# Patient Record
Sex: MALE | Race: WHITE | NOT HISPANIC OR LATINO
[De-identification: names, ages, dates, MRNs, and addresses within clinical notes are randomized per-mention and may not be internally consistent; named-entity substitution may affect disease eponyms.]

---

## 2017-06-07 PROBLEM — Z00.00 ENCOUNTER FOR PREVENTIVE HEALTH EXAMINATION: Status: ACTIVE | Noted: 2017-06-07

## 2017-06-12 ENCOUNTER — APPOINTMENT (OUTPATIENT)
Dept: ORTHOPEDIC SURGERY | Facility: CLINIC | Age: 62
End: 2017-06-12

## 2017-06-12 VITALS — BODY MASS INDEX: 29.8 KG/M2 | WEIGHT: 220 LBS | HEIGHT: 72 IN

## 2017-06-12 DIAGNOSIS — Z60.2 PROBLEMS RELATED TO LIVING ALONE: ICD-10-CM

## 2017-06-12 DIAGNOSIS — E78.00 PURE HYPERCHOLESTEROLEMIA, UNSPECIFIED: ICD-10-CM

## 2017-06-12 DIAGNOSIS — Z78.9 OTHER SPECIFIED HEALTH STATUS: ICD-10-CM

## 2017-06-12 DIAGNOSIS — Z82.61 FAMILY HISTORY OF ARTHRITIS: ICD-10-CM

## 2017-06-12 DIAGNOSIS — M51.36 OTHER INTERVERTEBRAL DISC DEGENERATION, LUMBAR REGION: ICD-10-CM

## 2017-06-12 DIAGNOSIS — Z86.73 PERSONAL HISTORY OF TRANSIENT ISCHEMIC ATTACK (TIA), AND CEREBRAL INFARCTION W/OUT RESIDUAL DEFICITS: ICD-10-CM

## 2017-06-12 RX ORDER — SIMVASTATIN 80 MG/1
TABLET, FILM COATED ORAL
Refills: 0 | Status: ACTIVE | COMMUNITY

## 2017-06-12 RX ORDER — BENAZEPRIL HYDROCHLORIDE AND HYDROCHLOROTHIAZIDE 20; 25 MG/1; MG/1
TABLET, FILM COATED ORAL
Refills: 0 | Status: ACTIVE | COMMUNITY

## 2017-06-12 RX ORDER — DICLOFENAC SODIUM 75 MG/1
75 TABLET, DELAYED RELEASE ORAL
Qty: 90 | Refills: 1 | Status: ACTIVE | COMMUNITY
Start: 2017-06-12 | End: 1900-01-01

## 2017-06-12 SDOH — SOCIAL STABILITY - SOCIAL INSECURITY: PROBLEMS RELATED TO LIVING ALONE: Z60.2

## 2018-07-28 PROBLEM — Z86.73 HISTORY OF STROKE: Status: RESOLVED | Noted: 2017-06-12 | Resolved: 2018-07-28

## 2019-10-02 PROBLEM — Z60.2 PERSON LIVING ALONE: Status: ACTIVE | Noted: 2017-06-12

## 2021-01-18 ENCOUNTER — APPOINTMENT (OUTPATIENT)
Dept: SURGERY | Facility: CLINIC | Age: 66
End: 2021-01-18
Payer: MEDICARE

## 2021-01-18 PROCEDURE — 99204 OFFICE O/P NEW MOD 45 MIN: CPT

## 2021-01-27 ENCOUNTER — OUTPATIENT (OUTPATIENT)
Dept: OUTPATIENT SERVICES | Facility: HOSPITAL | Age: 66
LOS: 1 days | End: 2021-01-27
Payer: MEDICARE

## 2021-01-27 VITALS
WEIGHT: 210.1 LBS | RESPIRATION RATE: 16 BRPM | OXYGEN SATURATION: 97 % | DIASTOLIC BLOOD PRESSURE: 62 MMHG | HEIGHT: 72 IN | SYSTOLIC BLOOD PRESSURE: 133 MMHG | HEART RATE: 56 BPM

## 2021-01-27 DIAGNOSIS — Z01.818 ENCOUNTER FOR OTHER PREPROCEDURAL EXAMINATION: ICD-10-CM

## 2021-01-27 DIAGNOSIS — K40.30 UNILATERAL INGUINAL HERNIA, WITH OBSTRUCTION, WITHOUT GANGRENE, NOT SPECIFIED AS RECURRENT: ICD-10-CM

## 2021-01-27 DIAGNOSIS — K40.90 UNILATERAL INGUINAL HERNIA, WITHOUT OBSTRUCTION OR GANGRENE, NOT SPECIFIED AS RECURRENT: ICD-10-CM

## 2021-01-27 LAB
ANION GAP SERPL CALC-SCNC: 6 MMOL/L — SIGNIFICANT CHANGE UP (ref 5–17)
BUN SERPL-MCNC: 17 MG/DL — SIGNIFICANT CHANGE UP (ref 7–23)
CALCIUM SERPL-MCNC: 9.2 MG/DL — SIGNIFICANT CHANGE UP (ref 8.5–10.1)
CHLORIDE SERPL-SCNC: 105 MMOL/L — SIGNIFICANT CHANGE UP (ref 96–108)
CO2 SERPL-SCNC: 30 MMOL/L — SIGNIFICANT CHANGE UP (ref 22–31)
CREAT SERPL-MCNC: 0.96 MG/DL — SIGNIFICANT CHANGE UP (ref 0.5–1.3)
GLUCOSE SERPL-MCNC: 92 MG/DL — SIGNIFICANT CHANGE UP (ref 70–99)
HCT VFR BLD CALC: 50.9 % — HIGH (ref 39–50)
HGB BLD-MCNC: 17.2 G/DL — HIGH (ref 13–17)
MCHC RBC-ENTMCNC: 30.8 PG — SIGNIFICANT CHANGE UP (ref 27–34)
MCHC RBC-ENTMCNC: 33.8 GM/DL — SIGNIFICANT CHANGE UP (ref 32–36)
MCV RBC AUTO: 91.2 FL — SIGNIFICANT CHANGE UP (ref 80–100)
NRBC # BLD: 0 /100 WBCS — SIGNIFICANT CHANGE UP (ref 0–0)
PLATELET # BLD AUTO: 159 K/UL — SIGNIFICANT CHANGE UP (ref 150–400)
POTASSIUM SERPL-MCNC: 4.4 MMOL/L — SIGNIFICANT CHANGE UP (ref 3.5–5.3)
POTASSIUM SERPL-SCNC: 4.4 MMOL/L — SIGNIFICANT CHANGE UP (ref 3.5–5.3)
RBC # BLD: 5.58 M/UL — SIGNIFICANT CHANGE UP (ref 4.2–5.8)
RBC # FLD: 11.8 % — SIGNIFICANT CHANGE UP (ref 10.3–14.5)
SODIUM SERPL-SCNC: 141 MMOL/L — SIGNIFICANT CHANGE UP (ref 135–145)
WBC # BLD: 6.14 K/UL — SIGNIFICANT CHANGE UP (ref 3.8–10.5)
WBC # FLD AUTO: 6.14 K/UL — SIGNIFICANT CHANGE UP (ref 3.8–10.5)

## 2021-01-27 PROCEDURE — 36415 COLL VENOUS BLD VENIPUNCTURE: CPT

## 2021-01-27 PROCEDURE — 93010 ELECTROCARDIOGRAM REPORT: CPT

## 2021-01-27 PROCEDURE — 85027 COMPLETE CBC AUTOMATED: CPT

## 2021-01-27 PROCEDURE — G0463: CPT

## 2021-01-27 PROCEDURE — 80048 BASIC METABOLIC PNL TOTAL CA: CPT

## 2021-01-27 PROCEDURE — 93005 ELECTROCARDIOGRAM TRACING: CPT

## 2021-01-27 NOTE — H&P PST ADULT - NSICDXPASTMEDICALHX_GEN_ALL_CORE_FT
PAST MEDICAL HISTORY:  HTN (hypertension)     Stroke 1995 no residual, had some slurred speech for few hrs-days

## 2021-01-27 NOTE — H&P PST ADULT - HISTORY OF PRESENT ILLNESS
67 y/o male, PMH of HTN , with c/o of bulging in the right groin for about 4 yrs. Some discomfort  , denies any pain. Was referred by urologist, seen by Dr Bueno about a week ago. Scheduled for right inguinal hernia repair with mesh. pre op testing today.

## 2021-01-27 NOTE — H&P PST ADULT - NS ABD PE RECTAL EXAM
normal appearance , without tenderness upon palpation , no deformities , trachea midline , Thyroid normal size , no thyroid nodules , no masses , no JVD , thyroid nontender not examined

## 2021-01-27 NOTE — H&P PST ADULT - NSICDXPROBLEM_GEN_ALL_CORE_FT
PROBLEM DIAGNOSES  Problem: Right inguinal hernia  Assessment and Plan: Repair of right inguinal hernia with mesh. pre op testing today.   Medical eval advised.  Pre op instructions:  Hold OTC supplements. Medications reviewed for the week and morning of surgery.  NPO after 11pm to the morning of surgery.  Shower 2 days before and the morning of surgery with antibacterial soap as instructed.  Covid testing information given.  Patient verbalized understanding.

## 2021-01-27 NOTE — H&P PST ADULT - NSANTHOSAYNRD_GEN_A_CORE
No. KELECHI screening performed.  STOP BANG Legend: 0-2 = LOW Risk; 3-4 = INTERMEDIATE Risk; 5-8 = HIGH Risk

## 2021-01-27 NOTE — H&P PST ADULT - NSICDXFAMILYHX_GEN_ALL_CORE_FT
FAMILY HISTORY:  FH: prostate cancer, Father:   FH: type 2 diabetes, Mother:   FH: type 2 diabetes, sister and brother: alive and well  FHx: heart disease, Mother:

## 2021-02-05 PROBLEM — I10 ESSENTIAL (PRIMARY) HYPERTENSION: Chronic | Status: ACTIVE | Noted: 2021-01-27

## 2021-02-05 PROBLEM — I63.9 CEREBRAL INFARCTION, UNSPECIFIED: Chronic | Status: ACTIVE | Noted: 2021-01-27

## 2021-02-08 ENCOUNTER — OUTPATIENT (OUTPATIENT)
Dept: OUTPATIENT SERVICES | Facility: HOSPITAL | Age: 66
LOS: 1 days | End: 2021-02-08
Payer: MEDICARE

## 2021-02-08 DIAGNOSIS — Z20.828 CONTACT WITH AND (SUSPECTED) EXPOSURE TO OTHER VIRAL COMMUNICABLE DISEASES: ICD-10-CM

## 2021-02-08 LAB — SARS-COV-2 RNA SPEC QL NAA+PROBE: SIGNIFICANT CHANGE UP

## 2021-02-08 PROCEDURE — U0003: CPT

## 2021-02-08 PROCEDURE — U0005: CPT

## 2021-02-09 ENCOUNTER — TRANSCRIPTION ENCOUNTER (OUTPATIENT)
Age: 66
End: 2021-02-09

## 2021-02-09 RX ORDER — SODIUM CHLORIDE 9 MG/ML
1000 INJECTION, SOLUTION INTRAVENOUS
Refills: 0 | Status: DISCONTINUED | OUTPATIENT
Start: 2021-02-09 | End: 2021-02-09

## 2021-02-09 NOTE — ASU PATIENT PROFILE, ADULT - FALLEN IN THE PAST
no Tazorac Counseling:  Patient advised that medication is irritating and drying.  Patient may need to apply sparingly and wash off after an hour before eventually leaving it on overnight.  The patient verbalized understanding of the proper use and possible adverse effects of tazorac.  All of the patient's questions and concerns were addressed.

## 2021-02-10 ENCOUNTER — APPOINTMENT (OUTPATIENT)
Dept: SURGERY | Facility: HOSPITAL | Age: 66
End: 2021-02-10
Payer: MEDICARE

## 2021-02-10 ENCOUNTER — OUTPATIENT (OUTPATIENT)
Dept: OUTPATIENT SERVICES | Facility: HOSPITAL | Age: 66
LOS: 1 days | End: 2021-02-10
Payer: MEDICARE

## 2021-02-10 VITALS
OXYGEN SATURATION: 94 % | DIASTOLIC BLOOD PRESSURE: 67 MMHG | SYSTOLIC BLOOD PRESSURE: 132 MMHG | TEMPERATURE: 98 F | HEART RATE: 50 BPM

## 2021-02-10 VITALS
HEART RATE: 74 BPM | TEMPERATURE: 98 F | DIASTOLIC BLOOD PRESSURE: 65 MMHG | OXYGEN SATURATION: 100 % | SYSTOLIC BLOOD PRESSURE: 136 MMHG | RESPIRATION RATE: 16 BRPM

## 2021-02-10 DIAGNOSIS — K40.30 UNILATERAL INGUINAL HERNIA, WITH OBSTRUCTION, WITHOUT GANGRENE, NOT SPECIFIED AS RECURRENT: ICD-10-CM

## 2021-02-10 PROCEDURE — 49507 PRP I/HERN INIT BLOCK >5 YR: CPT | Mod: AS,LT

## 2021-02-10 PROCEDURE — 49507 PRP I/HERN INIT BLOCK >5 YR: CPT | Mod: RT

## 2021-02-10 PROCEDURE — C1781: CPT

## 2021-02-10 RX ORDER — SODIUM CHLORIDE 9 MG/ML
1000 INJECTION, SOLUTION INTRAVENOUS
Refills: 0 | Status: DISCONTINUED | OUTPATIENT
Start: 2021-02-10 | End: 2021-02-10

## 2021-02-10 RX ORDER — ONDANSETRON 8 MG/1
4 TABLET, FILM COATED ORAL ONCE
Refills: 0 | Status: DISCONTINUED | OUTPATIENT
Start: 2021-02-10 | End: 2021-02-10

## 2021-02-10 RX ORDER — OXYCODONE HYDROCHLORIDE 5 MG/1
5 TABLET ORAL ONCE
Refills: 0 | Status: DISCONTINUED | OUTPATIENT
Start: 2021-02-10 | End: 2021-02-10

## 2021-02-10 RX ORDER — CEFAZOLIN SODIUM 1 G
1000 VIAL (EA) INJECTION ONCE
Refills: 0 | Status: COMPLETED | OUTPATIENT
Start: 2021-02-10 | End: 2021-02-10

## 2021-02-10 RX ORDER — HYDROCODONE BITARTRATE AND ACETAMINOPHEN 7.5; 325 MG/15ML; MG/15ML
1 SOLUTION ORAL
Qty: 30 | Refills: 0
Start: 2021-02-10

## 2021-02-10 RX ORDER — HYDROMORPHONE HYDROCHLORIDE 2 MG/ML
0.5 INJECTION INTRAMUSCULAR; INTRAVENOUS; SUBCUTANEOUS
Refills: 0 | Status: DISCONTINUED | OUTPATIENT
Start: 2021-02-10 | End: 2021-02-10

## 2021-02-10 RX ADMIN — SODIUM CHLORIDE 75 MILLILITER(S): 9 INJECTION, SOLUTION INTRAVENOUS at 06:46

## 2021-02-10 RX ADMIN — SODIUM CHLORIDE 75 MILLILITER(S): 9 INJECTION, SOLUTION INTRAVENOUS at 09:36

## 2021-02-10 NOTE — ASU DISCHARGE PLAN (ADULT/PEDIATRIC) - CARE PROVIDER_API CALL
Bairon Bueno (DO)  Surgery  [address] [facility], [address]  [place], [place] [id number]  Phone: [phone]  Fax: [phone]  Follow Up Time:

## 2021-02-10 NOTE — ASU DISCHARGE PLAN (ADULT/PEDIATRIC) - ASU DC SPECIAL INSTRUCTIONSFT
Leave steri strips intact  Ice packs to groin: 20 minutes on and 20 minutes off  Wear supportive undergarments  Regular walking  Deep breathing

## 2021-02-10 NOTE — ASU DISCHARGE PLAN (ADULT/PEDIATRIC) - CALL YOUR DOCTOR IF YOU HAVE ANY OF THE FOLLOWING:
Bleeding that does not stop/Swelling that gets worse/Pain not relieved by Medications/Fever greater than (need to indicate Fahrenheit or Celsius)/Wound/Surgical Site with redness, or foul smelling discharge or pus/Unable to urinate

## 2021-02-22 ENCOUNTER — APPOINTMENT (OUTPATIENT)
Dept: SURGERY | Facility: CLINIC | Age: 66
End: 2021-02-22

## 2021-04-05 ENCOUNTER — APPOINTMENT (OUTPATIENT)
Dept: SURGERY | Facility: CLINIC | Age: 66
End: 2021-04-05

## 2022-08-01 ENCOUNTER — EMERGENCY (EMERGENCY)
Facility: HOSPITAL | Age: 67
LOS: 1 days | Discharge: HOME | End: 2022-08-01
Attending: EMERGENCY MEDICINE | Admitting: EMERGENCY MEDICINE
Payer: MEDICARE

## 2022-08-01 VITALS
SYSTOLIC BLOOD PRESSURE: 139 MMHG | TEMPERATURE: 98 F | WEIGHT: 199.96 LBS | DIASTOLIC BLOOD PRESSURE: 75 MMHG | HEART RATE: 57 BPM | OXYGEN SATURATION: 97 % | HEIGHT: 72 IN | RESPIRATION RATE: 18 BRPM

## 2022-08-01 PROCEDURE — 99283 EMERGENCY DEPT VISIT LOW MDM: CPT | Mod: 25

## 2022-08-01 PROCEDURE — 73562 X-RAY EXAM OF KNEE 3: CPT

## 2022-08-01 PROCEDURE — 99283 EMERGENCY DEPT VISIT LOW MDM: CPT

## 2022-08-01 PROCEDURE — 73562 X-RAY EXAM OF KNEE 3: CPT | Mod: 26,RT

## 2022-08-01 NOTE — ED PROVIDER NOTE - PATIENT PORTAL LINK FT
You can access the FollowMyHealth Patient Portal offered by [facility] by registering at the following website: [email or web address]. By joining [employer]’s FollowMyHealth portal, you will also be able to view your health information using other applications (apps) compatible with our system.

## 2022-08-01 NOTE — ED PROVIDER NOTE - CARE PROVIDER_API CALL
Britton Santiago)  Orthopaedic Surgery Surgery  [address] [place] [address]  Phone: [phone]  Fax: [phone]  Follow Up Time:

## 2022-08-01 NOTE — ED ADULT TRIAGE NOTE - DATE OF LAST VACCINATION
(H35.363) Drusen (degenerative) of macula, bilateral - Assesment : Examination revealed Few Drusen. Pt reports is taking AREDS 2 Supplements as was recommended by previous Drs. - Plan : Monitor for changes. Advised patient to call our office with decreased vision or increased symptoms. Eath healthy and wear sunglasses. 01-May-2021

## 2022-08-01 NOTE — ED PROVIDER NOTE - NSFOLLOWUPINSTRUCTIONS_ED_ALL_ED_FT
KNEE PAIN - AfterCare(R) Instructions(ER/ED)           Knee Pain    WHAT YOU NEED TO KNOW:    Knee pain may start suddenly, or it may be a long-term problem. You may have pain on the side, front, or back of your knee. You may have knee stiffness and swelling. You may hear popping sounds or feel like your knee is giving way or locking up as you walk. You may feel pain when you sit, stand, walk, or climb up and down stairs. Knee pain can be caused by conditions such as obesity, inflammation, or strains or tears in ligaments or tendons.     DISCHARGE INSTRUCTIONS:    Return to the emergency department if:   •Your pain is worse, even after treatment.       •You cannot bend or straighten your leg completely.       •The swelling around your knee does not go down even with treatment.      •Your knee is painful and hot to the touch.       Contact your healthcare provider if:   •You have questions or concerns about your condition or care.           Medicines: You may need any of the following:   •NSAIDs help decrease swelling and pain or fever. This medicine is available with or without a doctor's order. NSAIDs can cause stomach bleeding or kidney problems in certain people. If you take blood thinner medicine, always ask your healthcare provider if NSAIDs are safe for you. Always read the medicine label and follow directions.      •Acetaminophen decreases pain and fever. It is available without a doctor's order. Ask how much to take and how often to take it. Follow directions. Read the labels of all other medicines you are using to see if they also contain acetaminophen, or ask your doctor or pharmacist. Acetaminophen can cause liver damage if not taken correctly.      •Prescription pain medicine may be given. Ask your healthcare provider how to take this medicine safely. Some prescription pain medicines contain acetaminophen. Do not take other medicines that contain acetaminophen without talking to your healthcare provider. Too much acetaminophen may cause liver damage. Prescription pain medicine may cause constipation. Ask your healthcare provider how to prevent or treat constipation.       •Take your medicine as directed. Contact your healthcare provider if you think your medicine is not helping or if you have side effects. Tell him or her if you are allergic to any medicine. Keep a list of the medicines, vitamins, and herbs you take. Include the amounts, and when and why you take them. Bring the list or the pill bottles to follow-up visits. Carry your medicine list with you in case of an emergency.      What you can do to manage your symptoms:   •Rest your knee so it can heal. Limit activities that increase your pain. Do low-impact exercises, such as walking or swimming.       •Apply ice to help reduce swelling and pain. Use an ice pack, or put crushed ice in a plastic bag. Cover it with a towel before you apply it to your knee. Apply ice for 15 to 20 minutes every hour, or as directed.      •Apply compression to help reduce swelling. Use a brace or bandage only as directed.      •Elevate your knee to help decrease pain and swelling. Elevate your knee while you are sitting or lying down. Prop your leg on pillows to keep your knee above the level of your heart.      •Prevent your knee from moving as directed. Your healthcare provider may put on a cast or splint. You may need to wear a leg brace to stabilize your knee. A leg brace can be adjusted to increase your range of motion as your knee heals.  Hinged Knee Braces            What you can do to prevent knee pain:   •Maintain a healthy weight. Extra weight increases your risk for knee pain. Ask your healthcare provider how much you should weigh. He or she can help you create a safe weight loss plan if you need to lose weight.      •Exercise or train properly. Use the correct equipment for sports. Wear shoes that provide good support. Check your posture often as you exercise, play sports, or train for an event. This can help prevent stress and strain on your knees. Rest between sessions so you do not overwork your knees.      Follow up with your healthcare provider within 24 hours or as directed: You may need follow-up treatments, such as steroid injections to decrease pain. Write down your questions so you remember to ask them during your visits.

## 2022-11-29 NOTE — H&P PST ADULT - OTHER CARE PROVIDERS
Viral Infections in Children: Care Instructions  Overview     Viruses cause many illnesses in children, from colds and stomach infections to mumps. Sometimes children have general symptoms--such as not feeling like eating or just not feeling well--that do not fit with a specific illness. If your child has a rash, your doctor may be able to tell clearly if your child has an illness such as measles. Sometimes a child may have what is called a nonspecific viral illness that is not as easy to name. A number of viruses can cause this mild illness. Antibiotics do not work for a viral illness. Your child will probably feel better in a few days. If not, call your child's doctor. Follow-up care is a key part of your child's treatment and safety. Be sure to make and go to all appointments, and call your doctor if your child is having problems. It's also a good idea to know your child's test results and keep a list of the medicines your child takes. How can you care for your child at home? Have your child rest.  Give your child acetaminophen (Tylenol) or ibuprofen (Advil, Motrin) for fever, pain, or fussiness. Read and follow all instructions on the label. Do not give aspirin to anyone younger than 20. It has been linked to Reye syndrome, a serious illness. Be careful when giving your child over-the-counter cold or flu medicines and Tylenol at the same time. Many of these medicines contain acetaminophen, which is Tylenol. Read the labels to make sure that you are not giving your child more than the recommended dose. Too much Tylenol can be harmful. Be careful with cough and cold medicines. Don't give them to children younger than 6, because they don't work for children that age and can even be harmful. For children 6 and older, always follow all the instructions carefully. Make sure you know how much medicine to give and how long to use it. And use the dosing device if one is included.   Give your child lots of fluids. This is very important if your child is vomiting or has diarrhea. Give your child sips of water or drinks such as Pedialyte or Infalyte. These drinks contain a mix of salt, sugar, and minerals. You can buy them at drugstores or grocery stores. Give these drinks as long as your child is throwing up or has diarrhea. Do not use them as the only source of liquids or food for more than 12 to 24 hours. Keep your child home from school, day care, or other public places while your child has a fever. Use cold, wet cloths on a rash to reduce itching. When should you call for help? Call 911 anytime you think you may need emergency care. For example, call if:    Your child has severe trouble breathing. Your child passes out (loses consciousness). Your child has a seizure. Call your doctor now or seek immediate medical care if:    Your child seems to be getting much sicker. Your child has a new or higher fever. Your child has a severe headache. Your child has a stiff neck. Your child has blood in their stools. Your child has new belly pain, or their pain gets worse. Your child has a new rash. Your child is confused or disoriented. Your child has trouble thinking or concentrating. Watch closely for changes in your child's health, and be sure to contact your doctor if:    Your child starts to get better and then gets worse. Your child does not get better as expected. Where can you learn more? Go to http://www.gray.com/  Enter D340 in the search box to learn more about \"Viral Infections in Children: Care Instructions. \"  Current as of: February 9, 2022               Content Version: 13.4  © 2998-8093 [employer]. Care instructions adapted under license by [employer] (which disclaims liability or warranty for this information).  If you have questions about a medical condition or this instruction, always ask your healthcare professional. [address] any warranty or liability for your use of this information. Cough in Children: Care Instructions  Overview  A cough is how your child's body responds to something that bothers your child's throat or airways. Many things can cause a cough. Your child might cough because of a cold or the flu, bronchitis, or asthma. Cigarette smoke, postnasal drip, allergies, and stomach acid that backs up into the throat also can cause coughs. A cough is a symptom, not a disease. Most coughs stop when the cause, such as a cold, goes away. You can take a few steps at home to help your child cough less and feel better. Follow-up care is a key part of your child's treatment and safety. Be sure to make and go to all appointments, and call your doctor if your child is having problems. It's also a good idea to know your child's test results and keep a list of the medicines your child takes. How can you care for your child at home? Have your child drink plenty of water and other fluids. This may help soothe a dry or sore throat. Honey or lemon juice in hot water or tea may ease a dry cough. Do not give honey to a child younger than 3year old. It may contain bacteria that are harmful to infants. Be careful with cough and cold medicines. Don't give them to children younger than 6, because they don't work for children that age and can even be harmful. For children 6 and older, always follow all the instructions carefully. Make sure you know how much medicine to give and how long to use it. And use the dosing device if one is included. Keep your child away from smoke. Do not smoke or let anyone else smoke around your child or in your house. Help your child avoid exposure to smoke, dust, or other pollutants, or have your child wear a face mask. Check with your doctor or pharmacist to find out which type of face mask will give your child the most benefit.   When should you Dr Pond call for help? Call 911 anytime you think your child may need emergency care. For example, call if:    Your child has severe trouble breathing. Symptoms may include:  Using the belly muscles to breathe. The chest sinking in or the nostrils flaring when your child struggles to breathe. Your child's skin and fingernails are gray or blue. Your child coughs up large amounts of blood or what looks like coffee grounds. Call your doctor now or seek immediate medical care if:    Your child coughs up blood. Your child has new or worse trouble breathing. Your child has a new or higher fever. Watch closely for changes in your child's health, and be sure to contact your doctor if:    Your child has a new symptom, such as an earache or a rash. Your child coughs more deeply or more often, especially if you notice more mucus or a change in the color of the mucus. Your child does not get better as expected. Where can you learn more? Go to http://www.gray.com/  Enter Q311 in the search box to learn more about \"Cough in Children: Care Instructions. \"  Current as of: May 4, 2022               Content Version: 13.4  © 2422-5710 [employer]. Care instructions adapted under license by [employer] (which disclaims liability or warranty for this information). If you have questions about a medical condition or this instruction, always ask your healthcare professional. [place] [address] any warranty or liability for your use of this information.

## 2023-03-20 ENCOUNTER — APPOINTMENT (OUTPATIENT)
Dept: SURGERY | Facility: CLINIC | Age: 68
End: 2023-03-20
Payer: MEDICARE

## 2023-03-20 VITALS
TEMPERATURE: 98.6 F | SYSTOLIC BLOOD PRESSURE: 124 MMHG | WEIGHT: 200 LBS | HEART RATE: 57 BPM | HEIGHT: 72 IN | BODY MASS INDEX: 27.09 KG/M2 | DIASTOLIC BLOOD PRESSURE: 76 MMHG | OXYGEN SATURATION: 97 %

## 2023-03-20 DIAGNOSIS — R10.32 LEFT LOWER QUADRANT PAIN: ICD-10-CM

## 2023-03-20 DIAGNOSIS — K40.30 UNILATERAL INGUINAL HERNIA, WITH OBSTRUCTION, W/OUT GANGRENE, NOT SPECIFIED AS RECURRENT: ICD-10-CM

## 2023-03-20 PROCEDURE — 99215 OFFICE O/P EST HI 40 MIN: CPT

## 2023-03-21 NOTE — CONSULT LETTER
[Dear  ___] : Dear  [unfilled], [DrSarah  ___] : Dr. ROLAND [Sincerely,] : Sincerely, [FreeTextEntry1] : I saw Blayne Tee in the office today.  He's complaining of a bulge and moderate pain in his left inguinal region when he lifts and strains.  He has been having symptoms for close to a year.  He is status post uneventful repair of an incarcerated right inguinal hernia on February 10, 2021.\par \par His past medical history is significant for hypertension, hypercholesterolemia, prostatism, and stroke 30 years ago with no deficits.  His past surgical history is as above and also significant for tonsillectomy as a child.  He denies use of tobacco and occasionally drinks alcohol.  He is employed as a .  His present medications include benazepril and finasteride.  He has no known drug allergies.  His father had a history of prostate cancer and succumbed to a brain aneurysm at age 82.  His mother had a history of cancer of unknown type and succumbed to a stroke at age 84.  A review of systems was performed and documented.\par \par Physical Exam:  General: No acute distress, conversant, well-nourished.  Eyes: PERRLA, EOMI, anicteric.  Conjunctiva are noninjected and moist.  Vision is grossly intact.  ENT: Hearing is grossly intact.  There is no nasal discharge.  Ears and nose are symmetrical and atraumatic.  Nasal, oral, and oral pharyngeal mucosa are pink and moist with no evidence of ulceration.  Head/neck: Head is normocephalic.  Neck is supple.  Carotids have good upstroke.  Trachea is in the midline.  No thyromegaly.  Respiratory: Lungs are clear to auscultation with good inspiratory effort.  No rales, rhonchi or wheezing.  Cardiovascular: Heart is regular in rate and rhythm with no murmurs.  Abdomen: Soft and nontender.  Good bowel sounds present in all 4 quadrants.  No guarding, no rebound, and no peritoneal signs.  No evidence of hepatosplenomegaly.  No evidence of abdominal wall hernias.   Lymphatics: There is no evidence of masses, or lymphadenopathy in the head, neck, trunk, axillary, supraclavicular, or inguinal regions.  Extremities: Extremities reveal no gross deformities, good range of motion, no evidence of edema, no varicosities, no lymphadenopathy and peripheral pulses are intact.  Skin: Good color, turgor, texture with no gross lesions, no eruptions or rashes, no subcutaneous nodules and normal temperature.  Psychiatric: Awake, alert, and oriented x3 with an appropriate affect.\par \par Pertinent physical findings:   He has a left inguinal hernia with no evidence of a right inguinal hernia.  His testes are descended bilaterally with no evidence of masses.  His phallus is normal.\par \par \par \par \par \par \par \par \par \par \par \par \par \par \par Impression: Incarcerated left inguinal hernia.\par \par Plan: He will undergo repair of his incarcerated left inguinal hernia pending medical clearance from Dr. Pond. The risks and the benefits of the procedure were explained to him at length.  All of his questions were answered. [FreeTextEntry3] : Bairon Bueno D.O., UMAIR, FACS\par , Surgery [facility]\par  Surgery [facility]

## 2023-03-27 ENCOUNTER — OUTPATIENT (OUTPATIENT)
Dept: OUTPATIENT SERVICES | Facility: HOSPITAL | Age: 68
LOS: 1 days | End: 2023-03-27
Payer: MEDICARE

## 2023-03-27 VITALS
HEIGHT: 72 IN | DIASTOLIC BLOOD PRESSURE: 76 MMHG | HEART RATE: 61 BPM | WEIGHT: 197.98 LBS | RESPIRATION RATE: 18 BRPM | TEMPERATURE: 98 F | SYSTOLIC BLOOD PRESSURE: 124 MMHG | OXYGEN SATURATION: 96 %

## 2023-03-27 DIAGNOSIS — Z01.818 ENCOUNTER FOR OTHER PREPROCEDURAL EXAMINATION: ICD-10-CM

## 2023-03-27 DIAGNOSIS — Z87.19 PERSONAL HISTORY OF OTHER DISEASES OF THE DIGESTIVE SYSTEM: ICD-10-CM

## 2023-03-27 DIAGNOSIS — Z98.890 OTHER SPECIFIED POSTPROCEDURAL STATES: Chronic | ICD-10-CM

## 2023-03-27 DIAGNOSIS — K40.30 UNILATERAL INGUINAL HERNIA, WITH OBSTRUCTION, WITHOUT GANGRENE, NOT SPECIFIED AS RECURRENT: ICD-10-CM

## 2023-03-27 PROCEDURE — 93010 ELECTROCARDIOGRAM REPORT: CPT

## 2023-03-27 PROCEDURE — G0463: CPT

## 2023-03-27 PROCEDURE — 80053 COMPREHEN METABOLIC PANEL: CPT

## 2023-03-27 PROCEDURE — 85027 COMPLETE CBC AUTOMATED: CPT

## 2023-03-27 PROCEDURE — 36415 COLL VENOUS BLD VENIPUNCTURE: CPT

## 2023-03-27 PROCEDURE — 93005 ELECTROCARDIOGRAM TRACING: CPT

## 2023-03-27 RX ORDER — OMEGA-3 ACID ETHYL ESTERS 1 G
1 CAPSULE ORAL
Qty: 0 | Refills: 0 | DISCHARGE

## 2023-03-27 RX ORDER — ASCORBIC ACID 60 MG
1 TABLET,CHEWABLE ORAL
Qty: 0 | Refills: 0 | DISCHARGE

## 2023-03-27 RX ORDER — MAGNESIUM HYDROXIDE 400 MG/1
15 TABLET, CHEWABLE ORAL
Qty: 0 | Refills: 0 | DISCHARGE

## 2023-03-27 NOTE — H&P PST ADULT - NSICDXPROCEDURE_GEN_ALL_CORE_FT
PROCEDURES:  Left femoral hernia repair 27-Mar-2023 15:14:25 inguinal hernia w/o gangrene Unique Fischer

## 2023-03-27 NOTE — H&P PST ADULT - ASSESSMENT
This is a 68 year who runs 2-3 miles daily, does weight lifting, level walk, without SOB, scheduled for repair of incarcerated left inguinal hernia with mesh on 4/4/2023.  Patient denies any loose or broken tooth, no denture

## 2023-03-27 NOTE — H&P PST ADULT - NSCAFFEINETYPE_GEN_ALL_CORE_SD
Physical Therapy    Visit Type: treatment  Present at bedside: Mother  Precautions: falls    SUBJECTIVE  Patient received awake, lying in crib watching show on iPad.     Per note from 4/16/22: Prior to hospitalization Mihir was a very active boy. He would run, jump, and do stairs with no problem. At home, mom says they have about 20 steps up to the second floor (where his bedroom is) and about 15 to the basement.     Patient / Family Goals: maximize function and return home  Face, Legs, Activity, Cry, Consolability Scale (FLACC)     Face: 1 - Occasional grimace or frown, withdrawn, disinterested    Legs: 0 - Normal position or relaxed    Activity: 0 - Lying quietly, normal position, moves easily    Cry: 2 - Crying steadily, screams or sobs, frequent complaints    Consolability: 2 - Difficult to console or comfort    Score: 5    OBJECTIVE        Affect/Behavior: afraid, crying and anxious  Transfers:      Sit to stand: minimal assist (from cube chair)    Stand to sit: minimal assist (to cube chair)  Training completed:    Tasks: sit to stand and stand to sit    Education details: patient safety  Gait/Ambulation:     Assistance: minimal assist   Assistive device: hand hold    Distance (ft): 20  Training Completed:    Tasks: gait training on level surfaces    Education details: patient safety      Interventions    Treatment Provided:  -activity tolerance, balance retraining, bed mobility training, caregiver training, breathing/relaxation and gait training     Education Completed    Person instructed: mother and patient    Education completed: role of PT, diagnosis/impairments pertaining to rehab, bed mobility, safe transfers and ambulation    Teaching method: verbal instruction/explanation    Response to education: Verbalizes understanding and Needs reinforcement    Discussed being out of the crib throughout the day.           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     Patient seen for PT session. Pt afraid and anxious. With max encouragement from mother and therapist, pt was able to ambulate to the window and back to the crib with one stop to sit in a cube chair.  Pt requiring hand hold assist for gait.  Pt initially with flexed posture and was upset but calmed down and stood straighter as he made his way back to the crib.      Pt presents with poor activity tolerance and is not currently functioning at his baseline 2/2 pain and discomfort. Pt to continue with skilled intervention during hospital stay to improve endurance and address remaining deficits.     Discharge Recommendations  Recommendation for Discharge: PT IL: Patient requires 24 HOUR assistance to perform mobility and/or ADLs safely    Skilled therapy is required to address these limitations in attempt to maximize the patient's independence.  - Progress: progressing toward goals  Pain at end of session:   Face, Legs, Activity, Cry, Consolability (FLACC) at end of session:    Face: 0-No particular expression or smile     Legs: 0-Normal position or relaxed    Activity: 0 - Lying quietly, normal position, moves easily    Cry: 0-No cry (awake or asleep)    Consolability: 0-Content, relaxed    Score: 0       Interpretation: 0=relaxed and comfortable, 1-3=mild discomfort, 4-6=moderate pain, 7-10=severe       discomfort patient or pain or both    End of Session:  Location: rails up and open crib  Safety measures: lines intact and equipment intact  Handoff to:  family/caregiver and nurse  Education Provided:   Learning assessment:  - Primary learner: mother and with patient present  - Are they ready to learn: yes  - Preferred learning style: verbal  - Barriers to learning: no barriers apparent at this time  PLAN    Suggestions for next session as indicated: PT Frequency: 3 days/week    Interventions: bed mobility, balance, neuromuscular re-education, HEP train/position, progress motor skills, strengthening, patient/family training, gait training, endurance training, stairs retraining, postural training and continued education  Agreement to plan and goals: Parent/caregiver agrees with goals and treatment plan      GOALS    Long Term Goals: (to be met by time of discharge from hospital)  Ambulation (even): Patient will ambulate on even surface for 50 feet with supervision.  Status: progressing/ongoing  Curb step: Patient will ambulate curb step with supervision and with minimal cues.  Status: progressing/ongoing    Patient will demonstrate floor <> stand transfer for age appropriate transitions.   Patient will be able to participate in a 30 minute physical therapy session with 1:1 or 2:1 activity to rest .       Documented in the chart in the following areas: Assessment.      Therapy procedure time and total treatment time can be found documented on the Time Entry flowsheet   coffee

## 2023-03-27 NOTE — H&P PST ADULT - HISTORY OF PRESENT ILLNESS
68 year old male with h/o stroke (no deficit) x 25 year ago, HTN , BPH,  68 year old male with h/o stroke (no deficit) 1995, HTN , BPH, with right inguinal bulge, presents for preop testing for scheduled repair of incarcerated left inguinal hernia with mesh on 04/07/2023. Patient denies any pain, no change in his bowel habits, no abdominal pain, no N/V.

## 2023-03-27 NOTE — H&P PST ADULT - PROBLEM SELECTOR PLAN 1
Scheduled for repair of incarcerated left inguinal hernia with mesh   pt provided with verbal and written preop instruction, verbalized understanding and teach back   pt is scheduled for evaluation with his PCP on 3/30/2023.

## 2023-03-27 NOTE — H&P PST ADULT - NSICDXPASTMEDICALHX_GEN_ALL_CORE_FT
PAST MEDICAL HISTORY:  HTN (hypertension)     Stroke 1995 no residual, had some slurred speech for few hrs-days     PAST MEDICAL HISTORY:  H/O inguinal hernia     History of BPH     HTN (hypertension)     Stroke 1995 no residual, had some slurred speech for few hrs-days

## 2023-04-06 ENCOUNTER — TRANSCRIPTION ENCOUNTER (OUTPATIENT)
Age: 68
End: 2023-04-06

## 2023-04-06 NOTE — ASU PATIENT PROFILE, ADULT - FALL HARM RISK - UNIVERSAL INTERVENTIONS
Bed in lowest position, wheels locked, appropriate side rails in place/Call bell, personal items and telephone in reach/Instruct patient to call for assistance before getting out of bed or chair/Non-slip footwear when patient is out of bed/Spray to call system/Physically safe environment - no spills, clutter or unnecessary equipment/Purposeful Proactive Rounding/Room/bathroom lighting operational, light cord in reach

## 2023-04-06 NOTE — ASU PATIENT PROFILE, ADULT - NSICDXPASTMEDICALHX_GEN_ALL_CORE_FT
PAST MEDICAL HISTORY:  H/O inguinal hernia     History of BPH     HTN (hypertension)     Stroke 1995 no residual, had some slurred speech for few hrs-days

## 2023-04-07 ENCOUNTER — APPOINTMENT (OUTPATIENT)
Dept: SURGERY | Facility: HOSPITAL | Age: 68
End: 2023-04-07
Payer: MEDICARE

## 2023-04-07 ENCOUNTER — TRANSCRIPTION ENCOUNTER (OUTPATIENT)
Age: 68
End: 2023-04-07

## 2023-04-07 ENCOUNTER — OUTPATIENT (OUTPATIENT)
Dept: OUTPATIENT SERVICES | Facility: HOSPITAL | Age: 68
LOS: 1 days | End: 2023-04-07
Payer: MEDICARE

## 2023-04-07 ENCOUNTER — RESULT REVIEW (OUTPATIENT)
Age: 68
End: 2023-04-07

## 2023-04-07 VITALS
RESPIRATION RATE: 17 BRPM | DIASTOLIC BLOOD PRESSURE: 75 MMHG | TEMPERATURE: 98 F | HEART RATE: 53 BPM | OXYGEN SATURATION: 98 % | SYSTOLIC BLOOD PRESSURE: 125 MMHG

## 2023-04-07 VITALS
WEIGHT: 197.98 LBS | DIASTOLIC BLOOD PRESSURE: 57 MMHG | HEART RATE: 48 BPM | TEMPERATURE: 98 F | HEIGHT: 72 IN | RESPIRATION RATE: 14 BRPM | OXYGEN SATURATION: 94 % | SYSTOLIC BLOOD PRESSURE: 113 MMHG

## 2023-04-07 DIAGNOSIS — Z98.890 OTHER SPECIFIED POSTPROCEDURAL STATES: Chronic | ICD-10-CM

## 2023-04-07 DIAGNOSIS — K40.30 UNILATERAL INGUINAL HERNIA, WITH OBSTRUCTION, WITHOUT GANGRENE, NOT SPECIFIED AS RECURRENT: ICD-10-CM

## 2023-04-07 PROCEDURE — 49507 PRP I/HERN INIT BLOCK >5 YR: CPT | Mod: LT

## 2023-04-07 PROCEDURE — 88304 TISSUE EXAM BY PATHOLOGIST: CPT

## 2023-04-07 PROCEDURE — 88304 TISSUE EXAM BY PATHOLOGIST: CPT | Mod: 26

## 2023-04-07 PROCEDURE — C1781: CPT

## 2023-04-07 DEVICE — MESH HERNIA INGUINAL PARIETEX PROGRIP RECTANGLE 15 X 9CM: Type: IMPLANTABLE DEVICE | Status: FUNCTIONAL

## 2023-04-07 RX ORDER — SODIUM CHLORIDE 9 MG/ML
1000 INJECTION, SOLUTION INTRAVENOUS
Refills: 0 | Status: DISCONTINUED | OUTPATIENT
Start: 2023-04-07 | End: 2023-04-07

## 2023-04-07 RX ORDER — HYDROCODONE BITARTRATE AND ACETAMINOPHEN 7.5; 325 MG/15ML; MG/15ML
1 SOLUTION ORAL
Qty: 15 | Refills: 0
Start: 2023-04-07

## 2023-04-07 RX ORDER — BENAZEPRIL HYDROCHLORIDE AND HYDROCHLOROTHIAZIDE 10; 12.5 MG/1; MG/1
1 TABLET, FILM COATED ORAL
Refills: 0 | DISCHARGE

## 2023-04-07 RX ORDER — OXYCODONE HYDROCHLORIDE 5 MG/1
5 TABLET ORAL ONCE
Refills: 0 | Status: DISCONTINUED | OUTPATIENT
Start: 2023-04-07 | End: 2023-04-07

## 2023-04-07 RX ORDER — MULTIVIT-MIN/FERROUS GLUCONATE 9 MG/15 ML
1 LIQUID (ML) ORAL
Qty: 0 | Refills: 0 | DISCHARGE

## 2023-04-07 RX ORDER — ONDANSETRON 8 MG/1
4 TABLET, FILM COATED ORAL ONCE
Refills: 0 | Status: DISCONTINUED | OUTPATIENT
Start: 2023-04-07 | End: 2023-04-07

## 2023-04-07 RX ORDER — IBUPROFEN 200 MG
1 TABLET ORAL
Qty: 20 | Refills: 0
Start: 2023-04-07

## 2023-04-07 RX ORDER — FINASTERIDE 5 MG/1
1 TABLET, FILM COATED ORAL
Qty: 0 | Refills: 0 | DISCHARGE

## 2023-04-07 RX ORDER — CEFAZOLIN SODIUM 1 G
1000 VIAL (EA) INJECTION ONCE
Refills: 0 | Status: COMPLETED | OUTPATIENT
Start: 2023-04-07 | End: 2023-04-07

## 2023-04-07 RX ORDER — OLMESARTAN MEDOXOMIL-HYDROCHLOROTHIAZIDE 25; 40 MG/1; MG/1
1 TABLET, FILM COATED ORAL
Qty: 0 | Refills: 0 | DISCHARGE

## 2023-04-07 RX ORDER — HYDROMORPHONE HYDROCHLORIDE 2 MG/ML
0.5 INJECTION INTRAMUSCULAR; INTRAVENOUS; SUBCUTANEOUS
Refills: 0 | Status: DISCONTINUED | OUTPATIENT
Start: 2023-04-07 | End: 2023-04-07

## 2023-04-07 RX ADMIN — SODIUM CHLORIDE 75 MILLILITER(S): 9 INJECTION, SOLUTION INTRAVENOUS at 12:31

## 2023-04-07 RX ADMIN — SODIUM CHLORIDE 50 MILLILITER(S): 9 INJECTION, SOLUTION INTRAVENOUS at 08:27

## 2023-04-07 NOTE — ASU DISCHARGE PLAN (ADULT/PEDIATRIC) - NS MD DC FALL RISK RISK
For information on Fall & Injury Prevention, visit: [email or web address] OR  [email or web address] OR  https://www.cdc.gov/steadi/patient.html

## 2023-04-07 NOTE — ASU DISCHARGE PLAN (ADULT/PEDIATRIC) - CARE PROVIDER_API CALL
Bairon Bueno (DO)  Surgery  [address] [place] [address]  Phone: [phone]  Fax: [phone]  Established Patient  Follow Up Time:

## 2023-04-11 LAB — SURGICAL PATHOLOGY STUDY: SIGNIFICANT CHANGE UP

## 2023-04-24 ENCOUNTER — APPOINTMENT (OUTPATIENT)
Dept: SURGERY | Facility: CLINIC | Age: 68
End: 2023-04-24
Payer: MEDICARE

## 2023-04-24 DIAGNOSIS — Z09 ENCOUNTER FOR FOLLOW-UP EXAMINATION AFTER COMPLETED TREATMENT FOR CONDITIONS OTHER THAN MALIGNANT NEOPLASM: ICD-10-CM

## 2023-04-24 PROBLEM — Z87.438 PERSONAL HISTORY OF OTHER DISEASES OF MALE GENITAL ORGANS: Chronic | Status: ACTIVE | Noted: 2023-03-27

## 2023-04-24 PROBLEM — Z87.19 PERSONAL HISTORY OF OTHER DISEASES OF THE DIGESTIVE SYSTEM: Chronic | Status: ACTIVE | Noted: 2023-03-27

## 2023-04-24 PROCEDURE — 99024 POSTOP FOLLOW-UP VISIT: CPT

## 2023-04-24 NOTE — PLAN
[FreeTextEntry1] : Status post repair incarcerated left inguinal hernia April 7, 2023.  Incision clean dry and intact.

## 2023-06-01 ENCOUNTER — APPOINTMENT (OUTPATIENT)
Dept: SURGERY | Facility: CLINIC | Age: 68
End: 2023-06-01

## 2023-06-05 ENCOUNTER — APPOINTMENT (OUTPATIENT)
Dept: SURGERY | Facility: CLINIC | Age: 68
End: 2023-06-05

## 2024-01-18 NOTE — ASU DISCHARGE PLAN (ADULT/PEDIATRIC) - FOR NEXT 24 HOURS DO NOT:
Refill passed per [facility] protocol.  Please review pended refill request as unable to refill due to high/very high drug interaction warning copied here:     High  Lactation Warning: valsartan-hydroCHLOROthiazideNot recommended for Lactation      Requested Prescriptions   Pending Prescriptions Disp Refills    VALSARTAN-HYDROCHLOROTHIAZIDE 80-12.5 MG Oral Tab [Pharmacy Med Name: VALSARTAN-HCTZ 80-12.5 MG TAB] 90 tablet 3     Sig: TAKE 1 TABLET BY MOUTH EVERY DAY       Hypertensive Medications Protocol Passed - 1/18/2024 12:19 AM        Passed - In person appointment in the past 12 or next 3 months     Recent Outpatient Visits              1 month ago RUFINO (obstructive sleep apnea)    [facility]    Office Visit    [facility]    Office Visit    1 month ago Somnolence    [facility] Shayla Pierre MD    Office Visit    8 months ago Routine physical examination    [facility] Shayla Pierre MD    Office Visit    10 months ago Osteoarthritis of left knee, unspecified osteoarthritis type    [facility] Shayla Pierre MD    Office Visit          Future Appointments         Provider Department Appt Notes    In 1 week Anjelica Graham MD [facility] [place]     In 2 weeks Shayla Pierre MD [facility] 2 month f/u               Passed - Last BP reading less than 140/90     BP Readings from Last 1 Encounters:   12/01/23 130/84               Passed - CMP or BMP in past 6 months     Recent Results (from the past 4392 hour(s))   Comp Metabolic Panel (14)    Collection Time: 12/01/23 11:38 AM   Result Value Ref Range    Glucose 120 (H) 70 - 99 mg/dL    Sodium 139 136 - 145 mmol/L    Potassium 4.2 3.5 - 5.1 mmol/L    Chloride 104 98 - 112 mmol/L    CO2 27.0  21.0 - 32.0 mmol/L    Anion Gap 8 0 - 18 mmol/L    BUN 26 (H) 9 - 23 mg/dL    Creatinine 1.00 0.55 - 1.02 mg/dL    BUN/CREA Ratio 26.0 (H) 10.0 - 20.0    Calcium, Total 10.7 (H) 8.7 - 10.4 mg/dL    Calculated Osmolality 294 275 - 295 mOsm/kg    eGFR-Cr 55 (L) >=60 mL/min/1.73m2    ALT 14 10 - 49 U/L    AST 22 <=34 U/L    Alkaline Phosphatase 69 55 - 142 U/L    Bilirubin, Total 0.6 0.2 - 1.1 mg/dL    Total Protein 7.3 5.7 - 8.2 g/dL    Albumin 4.5 3.2 - 4.8 g/dL    Globulin  2.8 2.8 - 4.4 g/dL    A/G Ratio 1.6 1.0 - 2.0    Patient Fasting for CMP? Yes      *Note: Due to a large number of results and/or encounters for the requested time period, some results have not been displayed. A complete set of results can be found in Results Review.               Passed - In person appointment or virtual visit in the past 6 months     Recent Outpatient Visits              1 month ago RUFINO (obstructive sleep apnea)    [facility]    Office Visit    1 month ago [facility]    Office Visit    1 month ago [facility] Shayla Pierre MD    Office Visit    8 months ago Routine physical examination    [facility] Shayla Pierre MD    Office Visit    10 months ago Osteoarthritis of left knee, unspecified osteoarthritis type    [facility] Shayla Pierre MD    Office Visit          Future Appointments         Provider Department Appt Notes    In 1 week Anjelica Graham MD [facility], [facility]     In 2 weeks Shayla Pierre MD [facility] 2 month f/u               Passed - EGFRCR or GFRNAA > 50     GFR Evaluation  EGFRCR: 55 , resulted on 12/1/2023             Future Appointments         Provider Department Appt Notes    In 1 week Anjelica Graham MD [facility], [facility], [place]     In 2 weeks Shayla Pierre MD [facility], [facility] 2 month f/u          Recent Outpatient Visits              1 month ago RUFINO (obstructive sleep apnea)    [facility]    Office Visit    1 month ago [facility]    Office Visit    1 month ago Somnolence    [facility], [facility] Shayla Pierre MD    Office Visit    8 months ago Routine physical examination    [facility] Shayla Pierre MD    Office Visit    10 months ago Osteoarthritis of left knee, unspecified osteoarthritis type    [facility] Shayla Pierre MD    Office Visit               Statement Selected

## 2025-05-23 NOTE — ED PROVIDER NOTE - LOCATION
MEDICARE WELLNESS VISIT + SOAP NOTE    Patient Care Team:  Shivam Kessler,  as PCP - General (Family Practice)    Frank presents for his Subsequent Annual Medicare Wellness Visit with Medicare Wellness Visit and Office Visit   Acute and chronic problems were discussed separately.    Status MWV Topics Details (Refresh Note to Update)    Depression Screening (Trend)   PHQ2 Interpretation Negative          PHQ2: Score = 0      Depression screening is negative no further plan needed.    Blood Pressure  Weight  BMI     /68  Current Weight 182 lbs  body mass index is 27.73 kg/m².  BMI is in overweight range.    See patient education in AVS         Advanced Directives on File Not on file. Needs to bring in a copy.      Health Risk Assessment  (Click to Review or Edit)   Completed      Falls Risk  Have you had a fall in the past year?................................. No  Do you feel unsteady when standing or walking?........ No  Do you worry about falling?.................................................. No       Tobacco/Alcohol/Drugs Never Smoker      reports current alcohol use of about 1.0 standard drink of alcohol per week.   reports no history of drug use.      Opioid Review (Update Meds)    No opioid on med list.      Cognitive/Functional Status  (Optional MOCA  Mini Cog)   No evidence of cognitive dysfunction by direct observation.    Vision and Hearing Screens    Not applicable      Health Maintenance (Link)  See patient instructions and orders           The following items on the Medicare Health Risk Assessment were found to be positive  11i.) Problems using the telephone: Sometimes     15.) How confident are you that you can control and manage most of your health problems?: Somewhat confident       I reviewed and updated the past Medical, Surgical, Family, Social History, Allergies and Medications in Epic: Yes    Family History   Problem Relation Age of Onset    COPD Mother 74    Cancer, Esophageal  Father 66    Cancer, Kidney Brother     Cancer, Prostate Brother          SOAP NOTE       Cherelle Marie is a 70 year old here for Medicare Wellness Visit and Office Visit    The patient presents for evaluation of diabetes, anemia, heartburn, hypertension, and annual medicare wellness visit.    He has been managing his diabetes effectively, as evidenced by a slight weight loss and increased physical activity. However. His last A1c increased to 6.6.    Mild anemia was noted as well. He reports no rectal bleeding, blood in stool, or black stool. His last colonoscopy was performed in 2023.    He experienced a severe poison ivy reaction approximately 6 to 7 weeks ago, which was managed with prednisone. However, he continues to experience intermittent itching. He has been using calamine lotion for relief and avoids scratching the affected area. He wears overalls and a hoodie when doing heavy gardening.    He maintains a diet rich in protein but low in iron, with minimal consumption of meat, chicken, and fish. He abstains from red meat and pork. He reports no unusual fatigue or tiredness and takes naps as needed. He divides his day into chores and leisure activities, ensuring he spends at least 2 solid hours on chores daily.    He is currently on omeprazole for heartburn, which he finds effective unless he consumes pasta with olive oil and cheese. He attempts to moderate his intake of such foods.    He has been on metoprolol for 3 to 4 years but forgot to take it last night and this morning. He does not monitor his blood pressure at home and is considering discontinuing the medication. He reports no dizziness associated with metoprolol use.    He has discontinued venlafaxine due to improved mood and has been practicing meditation. He has experienced emotional distress following the death of his sister's boyfriend. He occasionally consumes alcohol with his sister but does not report excessive drinking.    He has  lost approximately 20 pounds through increased physical activity, including daily walks and stair exercises. He has noticed difficulty with steps and has been working on strengthening his knees. He has a history of hepatitis A infection and is unable to donate blood. He has not had a hot dog since 1990. 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 He has been painting a series of them since the beginning of the year. He has 54 nephews and nieces.    He has a history of varicose veins and has undergone surgery for this condition.    PAST SURGICAL HISTORY:  Varicose vein surgery    SOCIAL HISTORY  She does not smoke. She drinks alcohol occasionally but does not go to excess.  Review of Systems  As documented above.    [facility] Never Smoker       Objective   Vitals:    05/23/25 0929   BP: 124/68   Pulse: (!) 60   Weight: 82.7 kg (182 lb 6.4 oz)   Height: 5' 8\" (1.727 m)   BMI (Calculated): 27.73     Physical Exam  General: Alert. Normal appearance.  Head: Normocephalic.  Eyes: Pupils equal, round and reactive to light. Conjunctivae normal.  Ear: Tympanic membranes and external ear canals normal.  Nose: Nares normal. No sinus tenderness.  Throat: Lips, mucosa, and tongue normal. Pharyngeal mucosa non-inflamed. Teeth normal.  Neck: Supple.Thyroid normal. Lymphadenopathy is not present.  Cardiovascular: Normal rate and regular rhythm. Normal S1, S2. Murmurs not present.  Respiratory: Normal respiratory effort. No wheezing, rales or rhonchi.  Abdomen: Soft, non-tender and bowel sounds active. No masses or organomegaly.  Musculoskeletal: Upper and lower extremity strength and range of motion is normal. Lower extremity edema not present. Gait is normal.  Neurologic: Cranial nerves 2-12 grossly intact. No focal deficits.  Genitalia: Exam deferred.  Psychiatric: Mood is normal, affect is normal, thought content is normal.    Other: Diabetic foot exam performed      ASSESSMENT AND PLAN        Medicare wellness visit,  subsequent.  Discussed health maintenance, including regular aerobic exercise, low fat diet, and periodic exams.  Diabetes Mellitus.  - Blood glucose levels have increased slightly from 6.0 to 6.6, placing her in the borderline diabetic range.  - A diabetic foot examination will be conducted today.  - Advised to continue her current dietary regimen and exercise routine.  - Blood glucose levels will be rechecked in 6 months.     Anemia.  - Presents with mild anemia, which may be due to inadequate dietary iron intake.  - Hemoglobin levels are marginally below the normal range.  - Advised to monitor for any signs of black stool, which could indicate gastrointestinal bleeding.  - CBC will be rechecked in 1 month, and iron levels will be assessed. If hemoglobin levels do not improve, a referral to a gastroenterologist may be considered.    Poison Ivy Dermatitis.  - Reports persistent itching following exposure to poison ivy 6 to 7 weeks ago.  - Advised to apply cortisone cream or calamine lotion to the affected areas and avoid scratching.    Gastroesophageal Reflux Disease (GERD).  Izaguirre's esophagus   - Currently taking omeprazole, which helps manage symptoms unless consuming certain foods like pasta with olive oil and cheese.  - Advised to continue current medication and dietary modifications.  - GI monitoring    HLD  Stable  Monitor  Heart healthy diet    Hypertension.  - Blood pressure remains within the normal range despite missing doses of metoprolol.  - Advised to monitor blood pressure at home.  - If blood pressure readings are consistently low, a reduction in the metoprolol dosage may be considered.    MDD  Stable  Off venlafaxine    BPH  Stable    Health Maintenance.  - Cholesterol levels are within the normal range.  - Thyroid function is normal.  - Liver function tests are normal.  - Calcium score is low, indicating a reduced risk for heart disease.  - Last colonoscopy was performed in 2023, due for another  in 2026.  - PSA test will be added to lab work.  - Will receive a tetanus vaccine today, valid for the next 10 years.  - COVID-19 booster will also be administered today.     Varicose Veins.  - Has a lot of varicose veins.  - Advised to wear compression stockings if available.  - If varicose veins worsen, a referral to a surgeon may be considered.    Follow-up  The patient will follow up in 6 months.  1. Medicare annual wellness visit, subsequent  2. Iron deficiency anemia, unspecified iron deficiency anemia type  -     CBC with Automated Differential; Future  -     Iron And total Iron Binding Capacity; Future  -     Ferritin; Future  -     Comprehensive Metabolic Panel; Future  -     CBC with Automated Differential; Future  -     PSA; Future  3. Screening PSA (prostate specific antigen)  -     PSA; Future  4. Type 2 diabetes mellitus with hyperglycemia, without long-term current use of insulin (CMD)  -     Glycohemoglobin; Future  5. Need for Tdap vaccination  -     TDAP (BOOSTRIX)  6. Essential hypertension  7. Mixed hyperlipidemia  8. Moderate episode of recurrent major depressive disorder (CMD)  9. Izaguirre's esophagus with esophagitis  10. Need for COVID-19 vaccine  -     COVID SPIKEVAX 12+  11. Gastroesophageal reflux disease with esophagitis without hemorrhage      Return in about 6 months (around 11/23/2025).            knee

## 2025-07-08 ENCOUNTER — APPOINTMENT (OUTPATIENT)
Dept: ORTHOPEDIC SURGERY | Facility: CLINIC | Age: 70
End: 2025-07-08
Payer: MEDICARE

## 2025-07-08 VITALS — WEIGHT: 200 LBS | BODY MASS INDEX: 27.09 KG/M2 | HEIGHT: 72 IN

## 2025-07-08 PROCEDURE — J3490M: CUSTOM | Mod: JZ

## 2025-07-08 PROCEDURE — 20611 DRAIN/INJ JOINT/BURSA W/US: CPT | Mod: 50

## 2025-07-08 PROCEDURE — 99204 OFFICE O/P NEW MOD 45 MIN: CPT | Mod: 25

## 2025-07-11 PROBLEM — M19.012 OSTEOARTHRITIS OF LEFT ACROMIOCLAVICULAR JOINT: Status: ACTIVE | Noted: 2025-07-11

## 2025-07-11 PROBLEM — M19.011 OSTEOARTHRITIS OF RIGHT ACROMIOCLAVICULAR JOINT: Status: ACTIVE | Noted: 2025-07-11

## 2025-07-11 PROBLEM — M75.21 BICEPS TENDINITIS OF RIGHT UPPER EXTREMITY: Status: ACTIVE | Noted: 2025-07-11

## 2025-07-11 PROBLEM — M75.22 BICEPS TENDINITIS OF LEFT UPPER EXTREMITY: Status: ACTIVE | Noted: 2025-07-11

## 2025-07-11 PROBLEM — M75.81 TENDINITIS OF BOTH ROTATOR CUFFS: Status: ACTIVE | Noted: 2025-07-11

## 2025-07-17 ENCOUNTER — APPOINTMENT (OUTPATIENT)
Dept: ORTHOPEDIC SURGERY | Facility: CLINIC | Age: 70
End: 2025-07-17
Payer: MEDICARE

## 2025-07-17 VITALS — BODY MASS INDEX: 27.09 KG/M2 | WEIGHT: 200 LBS | HEIGHT: 72 IN

## 2025-07-17 PROBLEM — M17.11 ARTHRITIS OF KNEE, RIGHT: Status: ACTIVE | Noted: 2025-07-17

## 2025-07-17 PROBLEM — M71.22 SYNOVIAL CYST OF LEFT POPLITEAL SPACE: Status: ACTIVE | Noted: 2025-07-17

## 2025-07-17 PROBLEM — M17.12 ARTHRITIS OF KNEE, LEFT: Status: ACTIVE | Noted: 2025-07-17

## 2025-07-17 PROBLEM — M71.21 SYNOVIAL CYST OF RIGHT POPLITEAL SPACE: Status: ACTIVE | Noted: 2025-07-17

## 2025-07-17 PROCEDURE — 99214 OFFICE O/P EST MOD 30 MIN: CPT

## 2025-07-17 PROCEDURE — 73564 X-RAY EXAM KNEE 4 OR MORE: CPT | Mod: 50

## 2025-07-17 RX ORDER — NAPROXEN 500 MG/1
500 TABLET ORAL TWICE DAILY
Qty: 30 | Refills: 0 | Status: ACTIVE | COMMUNITY
Start: 2025-07-17 | End: 1900-01-01

## (undated) DEVICE — SUT POLYSORB 2-0 30" GS-22 UNDYED

## (undated) DEVICE — VENODYNE/SCD SLEEVE CALF MEDIUM

## (undated) DEVICE — GLV 7.5 PROTEXIS (WHITE)

## (undated) DEVICE — SUT POLYSORB 2-0 30" V-20 UNDYED

## (undated) DEVICE — DRAPE INSTRUMENT POUCH 6.75" X 11"

## (undated) DEVICE — SOL IRR POUR NS 0.9% 1000ML

## (undated) DEVICE — DRAPE 3/4 SHEET W REINFORCEMENT 56X77"

## (undated) DEVICE — GLV 8 PROTEXIS (BLUE)

## (undated) DEVICE — GOWN SMARTGOWN XL/XLONG

## (undated) DEVICE — SUT NDL MAYO CATGUT 1/2 CIRCLE TAPER POINT 0.050" X 1.092"

## (undated) DEVICE — SOL IRR POUR H2O 1000ML

## (undated) DEVICE — ELCTR BOVIE TIP BLADE INSULATED 2.75" EDGE

## (undated) DEVICE — SUT POLYSORB 2-0 60" REEL

## (undated) DEVICE — SPONGE PEANUT AUTO COUNT

## (undated) DEVICE — PLV/PSP-ESU FORCEFX F8J7721A: Type: DURABLE MEDICAL EQUIPMENT

## (undated) DEVICE — PACK MINOR WITH LAP

## (undated) DEVICE — SUT POLYSORB 3-0 60" REEL

## (undated) DEVICE — SUCTION YANKAUER NO CONTROL VENT

## (undated) DEVICE — VENODYNE/SCD SLEEVE CALF LARGE

## (undated) DEVICE — DRAPE TOWEL BLUE 17" X 24"

## (undated) DEVICE — WARMING BLANKET UPPER ADULT

## (undated) DEVICE — DRAIN PENROSE .25" X 18" LATEX

## (undated) DEVICE — SYR ASEPTO

## (undated) DEVICE — SUT POLYSORB 3-0 30" V-20 UNDYED

## (undated) DEVICE — SUT POLYSORB 4-0 18" P-12 UNDYED

## (undated) DEVICE — TUBING FOR SMOKE EVACUATOR (PURPLE END)

## (undated) DEVICE — PLV-SCD MACHINE: Type: DURABLE MEDICAL EQUIPMENT

## (undated) DEVICE — SUT SOFSILK 2-0 30" V-20